# Patient Record
Sex: FEMALE | Race: WHITE | Employment: UNEMPLOYED | URBAN - METROPOLITAN AREA
[De-identification: names, ages, dates, MRNs, and addresses within clinical notes are randomized per-mention and may not be internally consistent; named-entity substitution may affect disease eponyms.]

---

## 2020-03-19 ENCOUNTER — HOSPITAL ENCOUNTER (EMERGENCY)
Age: 60
Discharge: HOME | End: 2020-03-19
Attending: EMERGENCY MEDICINE | Admitting: EMERGENCY MEDICINE
Payer: SELF-PAY

## 2020-03-19 VITALS
SYSTOLIC BLOOD PRESSURE: 111 MMHG | TEMPERATURE: 98.4 F | WEIGHT: 214 LBS | HEIGHT: 66 IN | HEART RATE: 91 BPM | BODY MASS INDEX: 34.39 KG/M2 | RESPIRATION RATE: 16 BRPM | DIASTOLIC BLOOD PRESSURE: 73 MMHG | OXYGEN SATURATION: 95 %

## 2020-03-19 DIAGNOSIS — K42.9 UMBILICAL HERNIA WITHOUT OBSTRUCTION AND WITHOUT GANGRENE: Primary | ICD-10-CM

## 2020-03-19 PROCEDURE — 99283 EMERGENCY DEPT VISIT LOW MDM: CPT

## 2020-03-19 NOTE — ED PROVIDER NOTES
69-year-old female with a history of hypertension, depression, lupus, peptic ulcer disease, bladder sling presents with umbilical hernia that she cannot reduce herself. She has had the hernia for a couple years. Yesterday it popped out and she was unable to reduce it herself. She denies any nausea or vomiting. She was constipated but she took an Ex-Lax and she is moving her bowels normally. She is self-pay and she does not want any blood work unless she is going to surgery. She would like to forego testing. Abdominal Pain    Pertinent negatives include no fever, no headaches, no chest pain and no back pain.         Past Medical History:   Diagnosis Date    Autoimmune disease ([address]     lupus    Hypertension     Psychiatric disorder     depression    PUD (peptic ulcer disease)        Past Surgical History:   Procedure Laterality Date    COLONOSCOPY,BIOPSY  2012         HX  SECTION  2002    HX DILATION AND CURETTAGE  ,    HX GYN  2002    circladge    HX OTHER SURGICAL      fibroid removed from uterus    HX TONSILLECTOMY      HX UROLOGICAL      bladder tac with sling         Family History:   Problem Relation Age of Onset    Alcohol abuse Mother     Cancer Mother     Lung Disease Mother     Psychiatric Disorder Mother     Alcohol abuse Father     Cancer Father     Alcohol abuse Sister     Psychiatric Disorder Sister        Social History     Socioeconomic History    Marital status:      Spouse name: Not on file    Number of children: Not on file    Years of education: Not on file    Highest education level: Not on file   Occupational History    Not on file   Social Needs    Financial resource strain: Not on file    Food insecurity     Worry: Not on file     Inability: Not on file    Transportation needs     Medical: Not on file     Non-medical: Not on file   Tobacco Use    Smoking status: Never Smoker    Smokeless tobacco: Never Used   Substance and Sexual Activity    Alcohol use: No    Drug use: No    Sexual activity: Not on file   Lifestyle    Physical activity     Days per week: Not on file     Minutes per session: Not on file    Stress: Not on file   Relationships    Social connections     Talks on phone: Not on file     Gets together: Not on file     Attends Muslim service: Not on file     Active member of club or organization: Not on file     Attends meetings of clubs or organizations: Not on file     Relationship status: Not on file    Intimate partner violence     Fear of current or ex partner: Not on file     Emotionally abused: Not on file     Physically abused: Not on file     Forced sexual activity: Not on file   Other Topics Concern    Not on file   Social History Narrative    Not on file         ALLERGIES: Sulfa (sulfonamide antibiotics)    Review of Systems   Constitutional: Negative for chills and fever. HENT: Negative for ear pain and sore throat. Eyes: Negative for pain. Respiratory: Negative for chest tightness and shortness of breath. Cardiovascular: Negative for chest pain. Gastrointestinal: Positive for abdominal pain. Genitourinary: Negative for flank pain. Musculoskeletal: Negative for back pain. Skin: Negative for rash. Neurological: Negative for headaches. All other systems reviewed and are negative. Vitals:    03/19/20 1332   BP: 121/72   Pulse: 91   Resp: 16   Temp: 98.4 °F (36.9 °C)   SpO2: 95%   Weight: 97.1 kg (214 lb)   Height: 5' 6\" (1.676 m)            Physical Exam  Vitals signs and nursing note reviewed. Constitutional:       General: She is not in acute distress. HENT:      Head: Normocephalic and atraumatic. Mouth/Throat:      Mouth: Mucous membranes are moist.   Eyes:      General: No scleral icterus. Conjunctiva/sclera: Conjunctivae normal.      Pupils: Pupils are equal, round, and reactive to light. Neck:      Musculoskeletal: Neck supple.       Trachea: No tracheal deviation. Cardiovascular:      Rate and Rhythm: Normal rate and regular rhythm. Pulmonary:      Effort: Pulmonary effort is normal. No respiratory distress. Breath sounds: No wheezing or rales. Abdominal:      General: There is no distension. Palpations: Abdomen is soft. Tenderness: There is no abdominal tenderness. Hernia: A hernia is present. Hernia is present in the umbilical area. Genitourinary:     Comments: deferred  Musculoskeletal:         General: No deformity. Skin:     General: Skin is warm and dry. Neurological:      General: No focal deficit present. Mental Status: She is alert. Psychiatric:         Mood and Affect: Mood normal.          [facility]       Procedures      1445  Hernia reduced with manual pressure after icing for 60 minutes. All questions answered. Patient appropriate for discharge. Given return precautions and follow up instructions. LABORATORY TESTS:  Labs Reviewed - No data to display    IMAGING RESULTS:  No orders to display       MEDICATIONS GIVEN:  Medications - No data to display    IMPRESSION:  1. Umbilical hernia without obstruction and without gangrene        PLAN:  1. Discharge Medication List as of 3/19/2020  3:00 PM        2. Follow-up Information     Follow up With Specialties Details Why Contact Info    Naomy Clifford MD General Surgery Schedule an appointment as soon as possible for a visit  [id number] [place] [address]  [phone]      [facility] EMERGENCY DEPT Emergency Medicine  If symptoms worsen or new concerns [address] [facility]  [phone]        3. Return to ED for new or worsening symptoms       Ian Salgado.  Samir Monte MD

## 2020-03-19 NOTE — ED TRIAGE NOTES
Pt arrives via POV. Hernia popped out and she was unable to reduce it herself yesterday. She was constipated but she took an Ex-Lax and she is moving her bowels normally.